# Patient Record
Sex: FEMALE | Race: WHITE | NOT HISPANIC OR LATINO | Employment: FULL TIME | URBAN - METROPOLITAN AREA
[De-identification: names, ages, dates, MRNs, and addresses within clinical notes are randomized per-mention and may not be internally consistent; named-entity substitution may affect disease eponyms.]

---

## 2022-03-27 ENCOUNTER — HOSPITAL ENCOUNTER (EMERGENCY)
Facility: CLINIC | Age: 22
Discharge: HOME | End: 2022-03-27
Attending: PHYSICIAN ASSISTANT | Admitting: PHYSICIAN ASSISTANT
Payer: COMMERCIAL

## 2022-03-27 VITALS
DIASTOLIC BLOOD PRESSURE: 88 MMHG | HEART RATE: 86 BPM | RESPIRATION RATE: 18 BRPM | OXYGEN SATURATION: 100 % | TEMPERATURE: 98 F | SYSTOLIC BLOOD PRESSURE: 119 MMHG

## 2022-03-27 DIAGNOSIS — B27.90 INFECTIOUS MONONUCLEOSIS WITHOUT COMPLICATION, INFECTIOUS MONONUCLEOSIS DUE TO UNSPECIFIED ORGANISM: ICD-10-CM

## 2022-03-27 LAB
ALBUMIN SERPL-MCNC: 2.8 G/DL (ref 3.4–5)
ALP SERPL-CCNC: 116 U/L (ref 40–150)
ALT SERPL W P-5'-P-CCNC: 65 U/L (ref 0–50)
ANION GAP SERPL CALCULATED.3IONS-SCNC: 5 MMOL/L (ref 3–14)
AST SERPL W P-5'-P-CCNC: ABNORMAL U/L
BASOPHILS # BLD MANUAL: 0 10E3/UL (ref 0–0.2)
BASOPHILS NFR BLD MANUAL: 0 %
BILIRUB SERPL-MCNC: 0.5 MG/DL (ref 0.2–1.3)
BUN SERPL-MCNC: 6 MG/DL (ref 7–30)
CALCIUM SERPL-MCNC: 9.1 MG/DL (ref 8.5–10.1)
CHLORIDE BLD-SCNC: 104 MMOL/L (ref 94–109)
CO2 SERPL-SCNC: 26 MMOL/L (ref 20–32)
CREAT SERPL-MCNC: 0.6 MG/DL (ref 0.52–1.04)
DEPRECATED S PYO AG THROAT QL EIA: NEGATIVE
EOSINOPHIL # BLD MANUAL: 0 10E3/UL (ref 0–0.7)
EOSINOPHIL NFR BLD MANUAL: 0 %
ERYTHROCYTE [DISTWIDTH] IN BLOOD BY AUTOMATED COUNT: 11.9 % (ref 10–15)
GFR SERPL CREATININE-BSD FRML MDRD: >90 ML/MIN/1.73M2
GLUCOSE BLD-MCNC: 142 MG/DL (ref 70–99)
GROUP A STREP BY PCR: NOT DETECTED
HCT VFR BLD AUTO: 36.2 % (ref 35–47)
HGB BLD-MCNC: 11.9 G/DL (ref 11.7–15.7)
LYMPHOCYTES # BLD MANUAL: 2 10E3/UL (ref 0.8–5.3)
LYMPHOCYTES NFR BLD MANUAL: 23 %
MCH RBC QN AUTO: 29.4 PG (ref 26.5–33)
MCHC RBC AUTO-ENTMCNC: 32.9 G/DL (ref 31.5–36.5)
MCV RBC AUTO: 89 FL (ref 78–100)
MONOCYTES # BLD MANUAL: 0.4 10E3/UL (ref 0–1.3)
MONOCYTES NFR BLD MANUAL: 5 %
NEUTROPHILS # BLD MANUAL: 6.1 10E3/UL (ref 1.6–8.3)
NEUTROPHILS NFR BLD MANUAL: 72 %
PLAT MORPH BLD: NORMAL
PLATELET # BLD AUTO: 172 10E3/UL (ref 150–450)
POTASSIUM BLD-SCNC: 4.2 MMOL/L (ref 3.4–5.3)
PROT SERPL-MCNC: 8.2 G/DL (ref 6.8–8.8)
RBC # BLD AUTO: 4.05 10E6/UL (ref 3.8–5.2)
RBC MORPH BLD: NORMAL
SODIUM SERPL-SCNC: 135 MMOL/L (ref 133–144)
WBC # BLD AUTO: 8.5 10E3/UL (ref 4–11)

## 2022-03-27 PROCEDURE — 258N000003 HC RX IP 258 OP 636: Performed by: PHYSICIAN ASSISTANT

## 2022-03-27 PROCEDURE — 250N000011 HC RX IP 250 OP 636: Performed by: PHYSICIAN ASSISTANT

## 2022-03-27 PROCEDURE — 84155 ASSAY OF PROTEIN SERUM: CPT | Performed by: PHYSICIAN ASSISTANT

## 2022-03-27 PROCEDURE — 96361 HYDRATE IV INFUSION ADD-ON: CPT

## 2022-03-27 PROCEDURE — 85027 COMPLETE CBC AUTOMATED: CPT | Performed by: PHYSICIAN ASSISTANT

## 2022-03-27 PROCEDURE — 36415 COLL VENOUS BLD VENIPUNCTURE: CPT | Performed by: PHYSICIAN ASSISTANT

## 2022-03-27 PROCEDURE — 87651 STREP A DNA AMP PROBE: CPT | Performed by: PHYSICIAN ASSISTANT

## 2022-03-27 PROCEDURE — 96374 THER/PROPH/DIAG INJ IV PUSH: CPT

## 2022-03-27 PROCEDURE — 99284 EMERGENCY DEPT VISIT MOD MDM: CPT | Mod: 25

## 2022-03-27 RX ORDER — DEXAMETHASONE SODIUM PHOSPHATE 10 MG/ML
10 INJECTION, SOLUTION INTRAMUSCULAR; INTRAVENOUS ONCE
Status: COMPLETED | OUTPATIENT
Start: 2022-03-27 | End: 2022-03-27

## 2022-03-27 RX ADMIN — DEXAMETHASONE SODIUM PHOSPHATE 10 MG: 10 INJECTION, SOLUTION INTRAMUSCULAR; INTRAVENOUS at 13:09

## 2022-03-27 RX ADMIN — SODIUM CHLORIDE 1000 ML: 9 INJECTION, SOLUTION INTRAVENOUS at 11:53

## 2022-03-27 ASSESSMENT — ENCOUNTER SYMPTOMS
SHORTNESS OF BREATH: 1
SORE THROAT: 1
ABDOMINAL PAIN: 1
VOMITING: 1
TROUBLE SWALLOWING: 1
VOICE CHANGE: 1
FEVER: 1

## 2022-03-27 NOTE — ED TRIAGE NOTES
Pt arrives with c/o worsening tonsil swelling s/p being dx with [place] on Wednesday. Pt reports decreased intake because of it. Pt's voice does sound muffled. Pt referred from . ABCs intact.

## 2022-03-27 NOTE — DISCHARGE INSTRUCTIONS
Use Tylenol 1000mg every 6 hours and ibuprofen 400mg every 8 hours for pain.  Push fluids.  Do not take any more of the prednisone.

## 2022-03-27 NOTE — ED PROVIDER NOTES
History   Chief Complaint:  Worsening Sabine     The history is provided by the patient and a parent (mother).      Susanne Torrez is a 22 year old female who presents with pharyngitis. The patient reports that she presented to Urgent Care on 03/23 for a week of sore throat and swollen tonsils. She was diagnosed with mono and was given magic mouthwash. She presented to Urgent Care again on 03/25 because her symptoms had not improved, and she was started on prednisone. She was seen at Urgent Care today but was referred here due to her tonsillar swelling and muffled voice. Here, she states that she has difficulty swallowing the prednisone but has been taking it. She struggles to even drink water. She has been vomiting but is able to keep some food and liquids down. She notes difficulty breathing while lying down, which wakes her up during the night. She also has abdominal pain when taking deep breaths. She still has a sore throat and has been taking Tylenol as she believes she may have low grade fevers.    Review of Systems   Constitutional: Positive for fever.   HENT: Positive for sore throat, trouble swallowing and voice change.    Respiratory: Positive for shortness of breath.    Gastrointestinal: Positive for abdominal pain and vomiting.   All other systems reviewed and are negative.    Allergies:  No Known Allergies    Medications:  Deltasone  Drospirenone-ethinyl estradiol    Past Medical History:     The patient denies past medical history.     Social History:  Presents to ED alone.    Physical Exam     Patient Vitals for the past 24 hrs:   BP Temp Temp src Pulse Resp SpO2   03/27/22 1330 119/88 -- -- 86 -- --   03/27/22 1315 -- -- -- -- -- 100 %   03/27/22 1300 -- -- -- -- -- 100 %   03/27/22 1245 -- -- -- -- -- 100 %   03/27/22 1230 -- -- -- -- -- 100 %   03/27/22 1215 -- -- -- -- -- 99 %   03/27/22 1200 -- -- -- -- -- 100 %   03/27/22 1106 126/85 98  F (36.7  C) Temporal 88 18 100 %       Physical  Exam  Constitutional: Pleasant. Cooperative.   Eyes: Pupils equally round and reactive  HENT: Head is normal in appearance. TMs normal. Moist mucous membranes. Erythematous posterior oropharynx. 2+ tonsils with marked exudates. No palatal asymmetry. Uvula midline. No trismus. Muffled voice. Tolerating their secretions.  Cardiovascular: Regular rate and rhythm.  Respiratory: Normal respiratory effort, lungs are clear bilaterally.  Neck: Normal ROM. Anterior cervical lymphadenopathy.  Skin: Normal, without rash.  Neurologic: Cranial nerves grossly intact. Alert.  Nursing notes and vital signs reviewed.    Emergency Department Course     Laboratory:  Labs Ordered and Resulted from Time of ED Arrival to Time of ED Departure   COMPREHENSIVE METABOLIC PANEL - Abnormal       Result Value    Sodium 135      Potassium 4.2      Chloride 104      Carbon Dioxide (CO2) 26      Anion Gap 5      Urea Nitrogen 6 (*)     Creatinine 0.60      Calcium 9.1      Glucose 142 (*)     Alkaline Phosphatase 116      AST        ALT 65 (*)     Protein Total 8.2      Albumin 2.8 (*)     Bilirubin Total 0.5      GFR Estimate >90     CBC WITH PLATELETS AND DIFFERENTIAL - Normal    WBC Count 8.5      RBC Count 4.05      Hemoglobin 11.9      Hematocrit 36.2      MCV 89      MCH 29.4      MCHC 32.9      RDW 11.9      Platelet Count 172     STREPTOCOCCUS A RAPID SCREEN W REFELX TO PCR - Normal    Group A Strep antigen Negative     GROUP A STREPTOCOCCUS PCR THROAT SWAB      Emergency Department Course:     Reviewed:  I reviewed nursing notes, vitals, past medical history and Care Everywhere.    Assessments:  1112 I obtained history and examined the patient as noted above.   1308 I rechecked the patient and explained findings.     Consults:  1301 I spoke with Dr. Timmons of ENT regarding the patient's presentation.     Interventions:  1153 NS 1L, IV Bolus   1309 Decadron 10 mg, IV    Disposition:  The patient was discharged to home.     Impression & Plan      Medical Decision Making:  Susanne Torrez is a 22 year old female who presents to the emergency department for evaluation of pharyngitis.  Patient has a known mononucleosis.  She notes that her swelling continues to progress and she is having difficulty breathing when she is laying down as well as difficulty swallowing.  She is currently on a course of prednisone.  See HPI as above for additional details.  Vitals and physical exam as above.  Given decrease in p.o. intake, baseline labs obtained as above.  Fortunately, no electrolyte abnormalities noted.  I did check strep to ensure there was no concurrent bacterial process.  This returned negative as well.  Patient able to take her steroids and thus able to swallow.  Patient able to pass p.o. challenge here in the ED.  I did discuss the case with the ENT,who noted that all findings are expected for mononucleosis, no indication for alternative intervention at this time.  I do not anticipate impending airway collapse at this point in time.  No tripoding or respiratory distress noted here in the ED.  I will have her sleep in a recliner.  Advised ongoing Tylenol and ibuprofen for pain.  Will give her dose of decadron and have her discontinue prednisone since she states she is having swallowing large prednisone pills.  Felt patient was safe for discharge to home. Discussed reasons to return. All questions answered. Patient discharged to home in stable condition.    Diagnosis:    ICD-10-CM    1. Infectious mononucleosis without complication, infectious mononucleosis due to unspecified organism  B27.90      Scribe Disclosure:  Reema VELASQUEZ, am serving as a scribe at 11:10 AM on 3/27/2022 to document services personally performed by Johnson Caputo PA-C based on my observations and the provider's statements to me.     This record was created at least in part using electronic voice recognition software, so please excuse any typographical errors.       Johnson Caputo  CONOR  03/27/22 1518